# Patient Record
Sex: MALE | Race: WHITE | Employment: UNEMPLOYED | URBAN - METROPOLITAN AREA
[De-identification: names, ages, dates, MRNs, and addresses within clinical notes are randomized per-mention and may not be internally consistent; named-entity substitution may affect disease eponyms.]

---

## 2021-03-26 DIAGNOSIS — M79.641 PAIN OF RIGHT HAND: Primary | ICD-10-CM

## 2021-03-29 ENCOUNTER — OFFICE VISIT (OUTPATIENT)
Dept: ORTHOPEDIC SURGERY | Age: 51
End: 2021-03-29
Payer: MEDICARE

## 2021-03-29 DIAGNOSIS — S62.352A CLOSED NONDISPLACED FRACTURE OF SHAFT OF THIRD METACARPAL BONE OF RIGHT HAND, INITIAL ENCOUNTER: Primary | ICD-10-CM

## 2021-03-29 PROCEDURE — 99203 OFFICE O/P NEW LOW 30 MIN: CPT | Performed by: PHYSICIAN ASSISTANT

## 2021-03-29 PROCEDURE — 29075 APPL CST ELBW FNGR SHORT ARM: CPT | Performed by: PHYSICIAN ASSISTANT

## 2021-04-01 PROBLEM — F31.9 BIPOLAR DISORDER (HCC): Status: ACTIVE | Noted: 2017-08-16

## 2021-04-01 PROBLEM — I73.9 PERIPHERAL VASCULAR DISEASE, UNSPECIFIED (HCC): Status: ACTIVE | Noted: 2017-08-24

## 2021-04-01 PROBLEM — E03.9 HYPOTHYROIDISM, UNSPECIFIED: Status: ACTIVE | Noted: 2017-08-16

## 2021-04-01 PROBLEM — F79 UNSPECIFIED INTELLECTUAL DISABILITIES: Status: ACTIVE | Noted: 2017-08-16

## 2021-04-01 RX ORDER — FLUPHENAZINE HYDROCHLORIDE 10 MG/1
TABLET ORAL
COMMUNITY

## 2021-04-01 RX ORDER — LEVOTHYROXINE SODIUM 0.12 MG/1
TABLET ORAL
COMMUNITY

## 2021-04-01 RX ORDER — RISPERIDONE 4 MG/1
TABLET, FILM COATED ORAL
COMMUNITY

## 2021-04-01 RX ORDER — BENZTROPINE MESYLATE 2 MG/1
TABLET ORAL
COMMUNITY

## 2021-04-01 RX ORDER — ALUMINA, MAGNESIA, AND SIMETHICONE 2400; 2400; 240 MG/30ML; MG/30ML; MG/30ML
SUSPENSION ORAL
COMMUNITY

## 2021-04-01 RX ORDER — DIVALPROEX SODIUM 500 MG/1
TABLET, DELAYED RELEASE ORAL
COMMUNITY

## 2021-04-01 RX ORDER — LORATADINE 10 MG/1
TABLET ORAL
COMMUNITY

## 2021-04-01 ASSESSMENT — ENCOUNTER SYMPTOMS
VOMITING: 0
COLOR CHANGE: 0
COUGH: 0
SHORTNESS OF BREATH: 0

## 2021-04-01 NOTE — PROGRESS NOTES
[facility]  [facility]  [address]  [place] [facility] [address]  Dept: [phone]    Ambulatory Orthopedic New Patient Visit      CHIEF COMPLAINT:    Chief Complaint   Patient presents with    Hand Pain     right       HISTORY OF PRESENT ILLNESS:      Date of Injury: unknown     The patient is a 54 y.o. male who is being seen  for consultation and evaluation of right hand pain. Patient is currently an inmate at [address] and is accompanied by 2 guards today in office. The patient does not know the specific date of injury but notes that he slammed his hand down on a table within the last 2 weeks and has had pain. The patient had an x-ray at [address] and was found to have a fracture, therefore he is here for further evaluation. Patient currently is in the medical unit at [address] and does not have a cellmate. The corrections officers note that he has been taking Tylenol and ibuprofen for his right hand pain but has not had any sort of splint placed on the right hand. The patient is right-hand dominant and denies previous trauma or injury to the right hand. Patient currently denies pain within the right hand but notes swelling. He notes he is able to make a tight fist without discomfort. He denies numbness and tingling in the right upper extremity. Past Medical History:    No past medical history on file. Past Surgical History:    No past surgical history on file. Current Medications:   Current Outpatient Medications   Medication Sig Dispense Refill    Acetaminophen 325 MG CAPS acetaminophen 325 mg capsule   Take 2 capsules every 4 hours by oral route as needed.  benztropine (COGENTIN) 2 MG tablet benztropine 2 mg tablet   Take 1 tablet twice a day by oral route.  divalproex (DEPAKOTE) 500 MG DR tablet divalproex 500 mg tablet,delayed release   Take 1 tablet twice a day by oral route.       fluPHENAZine HCl (PROLIXIN) 10 MG tablet fluphenazine 10 mg tablet   Take 1 tablet every day by oral route.  levothyroxine (SYNTHROID) 125 MCG tablet levothyroxine 125 mcg tablet   Take 1 tablet every day by oral route.  loratadine (CLARITIN) 10 MG tablet loratadine 10 mg tablet   Take 1 tablet every day by oral route.  risperiDONE (RISPERDAL) 4 MG tablet risperidone 4 mg tablet   Take 1 tablet twice a day by oral route.  magnesium hydroxide (MILK OF MAGNESIA) 400 MG/5ML suspension Milk of Magnesia   30 mL by mouth once daily as needed for constipation      aluminum & magnesium hydroxide-simethicone (ANTACID MAXIMUM STRENGTH) 400-400-40 MG/5ML SUSP Antacid Maximum Strength   15ML by mouth every 6 hours as needed for indigestion/upset stomach       No current facility-administered medications for this visit.         Allergies:    No known allergies    Social History:   Social History     Socioeconomic History    Marital status: Single     Spouse name: Not on file    Number of children: Not on file    Years of education: Not on file    Highest education level: Not on file   Occupational History    Not on file   Social Needs    Financial resource strain: Not on file    Food insecurity     Worry: Not on file     Inability: Not on file    Transportation needs     Medical: Not on file     Non-medical: Not on file   Tobacco Use    Smoking status: Not on file   Substance and Sexual Activity    Alcohol use: Not on file    Drug use: Not on file    Sexual activity: Not on file   Lifestyle    Physical activity     Days per week: Not on file     Minutes per session: Not on file    Stress: Not on file   Relationships    Social connections     Talks on phone: Not on file     Gets together: Not on file     Attends Anabaptist service: Not on file     Active member of club or organization: Not on file     Attends meetings of clubs or organizations: Not on file     Relationship status: Not on file    Intimate partner violence     Fear of current or ex partner: Not on file     Emotionally abused: Not on file     Physically abused: Not on file     Forced sexual activity: Not on file   Other Topics Concern    Not on file   Social History Narrative    Not on file       Family History:  No family history on file. REVIEW OF SYSTEMS:  Review of Systems   Constitutional: Negative for activity change and fever. HENT: Negative for sneezing. Respiratory: Negative for cough and shortness of breath. Cardiovascular: Negative for chest pain. Gastrointestinal: Negative for vomiting. Musculoskeletal: Positive for arthralgias (right hand) and joint swelling (right hand). Negative for myalgias. Skin: Negative for color change. Neurological: Negative for weakness and numbness. Psychiatric/Behavioral: Negative for sleep disturbance. PHYSICAL EXAM:  There were no vitals taken for this visit. There is no height or weight on file to calculate BMI. Physical Exam  Gen: alert and oriented  Psych:  Appropriate affect; Appropriate knowledge base; Appropriate mood; No hallucinations; Head: normocephalic, atraumatic   Chest: symmetric chest excursion  Pelvis: stable with ambulation  Ortho Exam  Extremity: Patient arrives seated in the wheelchair. Evaluation of the right hand reveals mild diffuse swelling along the dorsum of the right hand and all 5 fingers. There is healing ecchymosis noted within all 5 fingers. The patient arrived without a splint on the right upper extremity. Patient is able to make a tight composite fist with the right hand without difficulty. Tenderness palpation noted along the third metacarpal and its entirety. No tenderness palpation noted along the first, second, fourth or fifth metacarpals. No tenderness palpation noted along the phalanges on the right hand. Sensation is intact to light touch of the right upper extremity without focal deficits present.   The patient has full range of motion of the right wrist and elbow without difficulty. Radial pulse 2+ on the right. The skin is noted to be pink and warm with brisk capillary refill distally. Radiology:     Xr Hand Right (min 3 Views)    Result Date: 3/30/2021  History: Right middle finger metacarpal fracture Findings: AP, lateral, oblique x-rays of the right hand done in the office today shows middle finger comminuted metacarpal fracture healing in near-anatomic position. No further evidence of fracture, subluxation, dislocation, radiopaque foreign body, radiopaque tumors noted. Impression: Right middle finger metacarpal fracture healing as described above. Procedure:     After informed consent was obtained verbally, a well molded fast form short arm radial gutter cast was applied with the Right wrist and hand in an intrinsic plus position with the second and third fingers flexed to approximately 90 degrees. No impingement of the cast was noted proximally or distally after application. The fingers were noted to be pink, warm, with brisk capillary refill after application of the right upper extremity radial gutter cast.  The patient tolerated the fast form cast application well without complications. ASSESSMENT:     1. Closed nondisplaced fracture of shaft of third metacarpal bone of right hand, initial encounter         PLAN:       Today in office we discussed etiology and natural history of right hand third metacarpal fracture. I personally reviewed the patient's x-rays from today revealing right hand, comminuted third metacarpal fracture healing in near anatomic position. Due to the fact that the patient is an inmate at [address] he was placed in a fast form radial gutter cast on the right upper extremity in an intrinsic plus position. Patient was instructed to keep the cast clean and dry and follow-up in 2 week for x-rays of the right hand out of splint.   The patient was placed in a fast form cast due to the fact that he cannot have any casting material or splints with metal pieces in them. The patient's correction officers were instructed to have Julia Rios from [address] call to schedule the 2 week follow-up appointment. The patient may continue to take Tylenol and ibuprofen as needed for his right upper extremity pain, which will be scheduled by the half-way. The patient will follow up in 2 weeks for x-rays of the right hand out of splint. Return in about 2 weeks (around 4/12/2021) for re-evaluation, right hand x-rays. No orders of the defined types were placed in this encounter. Orders Placed This Encounter   Procedures    [address] - [place] APPLY FOREARM CAST       This note is created with the assistance of a speech recognition program.  While intending to generate a document that actually reflects the content of the visit, the document can still have some errors including those of syntax and sound a like substitutions which may escape proof reading. In such instances, actual meaning can be extrapolated by contextual diversion.      Electronically signed by Harlan Sanchez PA-C 4/1/2021 at 10:02 AM

## 2021-04-09 DIAGNOSIS — S62.352A CLOSED NONDISPLACED FRACTURE OF SHAFT OF THIRD METACARPAL BONE OF RIGHT HAND, INITIAL ENCOUNTER: Primary | ICD-10-CM

## 2021-04-19 ENCOUNTER — OFFICE VISIT (OUTPATIENT)
Dept: ORTHOPEDIC SURGERY | Age: 51
End: 2021-04-19

## 2021-04-19 DIAGNOSIS — S62.352D CLOSED NONDISPLACED FRACTURE OF SHAFT OF THIRD METACARPAL BONE OF RIGHT HAND WITH ROUTINE HEALING, SUBSEQUENT ENCOUNTER: Primary | ICD-10-CM

## 2021-04-19 PROCEDURE — 99214 OFFICE O/P EST MOD 30 MIN: CPT | Performed by: ORTHOPAEDIC SURGERY

## 2021-04-19 PROCEDURE — 29075 APPL CST ELBW FNGR SHORT ARM: CPT | Performed by: ORTHOPAEDIC SURGERY

## 2021-04-19 ASSESSMENT — ENCOUNTER SYMPTOMS
CHEST TIGHTNESS: 0
COUGH: 0
VOMITING: 0
ABDOMINAL PAIN: 0
APNEA: 0

## 2021-04-19 NOTE — PROGRESS NOTES
[facility]  [facility] SPECIAL[facility]  [address] [place]  [address]  Dept: [phone]  Dept Fax: [phone]        Ambulatory Follow Up      Subjective:   Kristyn Moody is a 54y.o. year old male who presents to our office today for routine followup regarding his   1. Closed nondisplaced fracture of shaft of third metacarpal bone of right hand with routine healing, subsequent encounter    . Chief Complaint   Patient presents with    Follow-up     rt hand fx        HPI- Kristyn Moody  is a 54 y.o. Right hand dominant  male who presents today in follow for third metacarpal fracture that was sustained on an unknown date. The patient is an inmate from [employer] and has two police escorts today. The patient was last seen on 3/29/2021 and underwent treatment in the form of Fast form ulnar gutter cast to the right upper extremity. The patient has maintained splint to the right upper extremity at this time. The patient overall has mild pain to the right hand with making a fist.         Review of Systems   Constitutional: Negative for chills and fever. Respiratory: Negative for apnea, cough and chest tightness. Cardiovascular: Negative for chest pain and palpitations. Gastrointestinal: Negative for abdominal pain and vomiting. Genitourinary: Negative for difficulty urinating. Musculoskeletal: Positive for arthralgias (right hand). Negative for gait problem, joint swelling and myalgias. Neurological: Negative for dizziness, weakness and numbness. I have reviewed the CC, HPI, ROS, PMH, FHX, Social History, and if not present in this note, I have reviewed in the patient's chart. I agree with the documentation provided by other staff and have reviewed their documentation prior to providing my signature indicating agreement. Objective : There were no vitals taken for this visit. There is no height or weight on file to calculate BMI.   General: Kristyn Moody is a 54 y.o. male who is alert and oriented and sitting comfortably in our office. Ortho Exam  MS:  Mild diffuse swelling right hand. Patient able to make a loose composite fist on the right. Mild extensor lag MCP right middle finger. Motor, sensory, vascular examination right upper extremity is grossly intact without focal deficits. Cascade is normal right hand. Neuro: alert and oriented to person and place. Eyes: Extra-ocular muscles intact  Mouth: Oral mucosa moist. No perioral lesions  Pulm: Respirations unlabored and regular. Symmetric chest excursion without outward deformity is noted. Skin: warm, well perfused  Psych:   Patient has good fund of knowledge and displays understanging of exam, diagnosis, and plan. Radiology:     Xr Hand Right (min 3 Views)    Result Date: 4/19/2021  History: Right middle finger metacarpal fracture Findings: AP, lateral, oblique x-rays of the right hand done in the office today shows a right middle finger metacarpal fracture, comminuted, mild displacement, mild angulation, unchanged in alignment from previous x-ray evaluation of March 29, 2021. Mild increased bony callus formation and bony consolidation is appreciated today when compared to those x-rays of March 29, 2021. No further evidence of fracture, subluxation, dislocation, radiopaque foreign body, radiopaque tumors noted. Impression: Right middle finger metacarpal fracture healing as described above. Xr Hand Right (min 3 Views)    Result Date: 3/30/2021  History: Right middle finger metacarpal fracture Findings: AP, lateral, oblique x-rays of the right hand done in the office today shows middle finger comminuted metacarpal fracture healing in near-anatomic position. No further evidence of fracture, subluxation, dislocation, radiopaque foreign body, radiopaque tumors noted.  Impression: Right middle finger metacarpal fracture healing as described above    After informed consent was obtained verbally, a well molded well-padded short arm fast form cast was applied with the Right    wrist in neutral position. No impingement of the cast was noted proximally or distally after application of a cast.  Fingers were noted to be pink, warm, with brisk capillary refill after application of the cast.  Tolerated the procedure well without post cast application complications. Assessment:      1. Closed nondisplaced fracture of shaft of third metacarpal bone of right hand with routine healing, subsequent encounter       Plan:     Reviewed x-rays with the patient today in the office. Discussed with the patient that we will place him into a new fast form short arm cast. Will have the patient follow up in the office in 1 month for x-rays out of cast. The patient knows to call with any questions or concerns. Follow up:Return in about 4 weeks (around 5/17/2021) for Xrays out of cast.    No orders of the defined types were placed in this encounter. No orders of the defined types were placed in this encounter. Tracy Valente LPN am scribing for and in the presence of Dr. Paulo Luke  4/20/2021 9:27 PM      I have reviewed and made changes accordingly to the work scribed by Belkis Mehta LPN. The documentation accurately reflects work and decisions made by me. I have also reviewed documentation completed by clinical staff.     Paulo Luke DO, [facility] Medicine  4/20/2021 9:27 PM    This note is created with the assistance of a speech recognition program.  While intending to generate a document that actually reflects the content of the visit, the document can still have some errors including those of syntax and sound a like substitutions which may escape proof reading.  In such instances, actual meaning can be extrapolated by contextual diversion      Electronically signed by Jorge Luis Sargent DO, FAOAO on 4/20/2021 at 9:27 PM

## 2024-08-13 ENCOUNTER — HOSPITAL ENCOUNTER (EMERGENCY)
Age: 54
Discharge: HOME | End: 2024-08-13
Attending: EMERGENCY MEDICINE
Payer: MEDICARE

## 2024-08-13 ENCOUNTER — APPOINTMENT (OUTPATIENT)
Dept: CT IMAGING | Age: 54
End: 2024-08-13
Payer: MEDICARE

## 2024-08-13 ENCOUNTER — APPOINTMENT (OUTPATIENT)
Dept: GENERAL RADIOLOGY | Age: 54
End: 2024-08-13
Payer: MEDICARE

## 2024-08-13 VITALS
BODY MASS INDEX: 20.66 KG/M2 | HEART RATE: 63 BPM | DIASTOLIC BLOOD PRESSURE: 82 MMHG | HEIGHT: 77 IN | OXYGEN SATURATION: 96 % | RESPIRATION RATE: 15 BRPM | TEMPERATURE: 97.7 F | SYSTOLIC BLOOD PRESSURE: 115 MMHG | WEIGHT: 175 LBS

## 2024-08-13 DIAGNOSIS — R55 NEAR SYNCOPE: Primary | ICD-10-CM

## 2024-08-13 LAB
ALBUMIN SERPL-MCNC: 3.9 G/DL (ref 3.5–5.2)
ALBUMIN/GLOB SERPL: 2 {RATIO} (ref 1–2.5)
ALP SERPL-CCNC: 67 U/L (ref 40–129)
ALT SERPL-CCNC: 17 U/L (ref 10–50)
AMMONIA PLAS-SCNC: 42 UMOL/L (ref 16–60)
ANION GAP SERPL CALCULATED.3IONS-SCNC: 12 MMOL/L (ref 9–16)
AST SERPL-CCNC: 21 U/L (ref 10–50)
BACTERIA URNS QL MICRO: ABNORMAL
BASOPHILS # BLD: 0.07 K/UL (ref 0–0.2)
BASOPHILS NFR BLD: 1 % (ref 0–2)
BILIRUB SERPL-MCNC: 0.2 MG/DL (ref 0–1.2)
BILIRUB UR QL STRIP: NEGATIVE
BUN BLD-MCNC: 21 MG/DL (ref 8–26)
BUN SERPL-MCNC: 17 MG/DL (ref 6–20)
CA-I BLD-SCNC: 1.18 MMOL/L (ref 1.13–1.33)
CA-I BLD-SCNC: 1.35 MMOL/L (ref 1.15–1.33)
CALCIUM SERPL-MCNC: 8.5 MG/DL (ref 8.6–10.4)
CASTS #/AREA URNS LPF: ABNORMAL /LPF (ref 0–8)
CHLORIDE BLD-SCNC: 101 MMOL/L (ref 98–107)
CHLORIDE SERPL-SCNC: 104 MMOL/L (ref 98–107)
CLARITY UR: CLEAR
CO2 BLD CALC-SCNC: 30 MMOL/L (ref 22–30)
CO2 SERPL-SCNC: 20 MMOL/L (ref 20–31)
COLOR UR: YELLOW
CREAT SERPL-MCNC: 1.2 MG/DL (ref 0.7–1.2)
EGFR, POC: 73 ML/MIN/1.73M2
EOSINOPHIL # BLD: 0.21 K/UL (ref 0–0.44)
EOSINOPHILS RELATIVE PERCENT: 3 % (ref 1–4)
EPI CELLS #/AREA URNS HPF: ABNORMAL /HPF (ref 0–5)
ERYTHROCYTE [DISTWIDTH] IN BLOOD BY AUTOMATED COUNT: 12 % (ref 11.8–14.4)
GFR, ESTIMATED: 70 ML/MIN/1.73M2
GLUCOSE BLD-MCNC: 140 MG/DL (ref 74–100)
GLUCOSE SERPL-MCNC: 111 MG/DL (ref 74–99)
GLUCOSE UR STRIP-MCNC: NEGATIVE MG/DL
HCO3 VENOUS: 29 MMOL/L (ref 22–29)
HCT VFR BLD AUTO: 43.5 % (ref 40.7–50.3)
HCT VFR BLD AUTO: 46 % (ref 41–53)
HGB BLD-MCNC: 14.7 G/DL (ref 13–17)
HGB UR QL STRIP.AUTO: NEGATIVE
IMM GRANULOCYTES # BLD AUTO: <0.03 K/UL (ref 0–0.3)
IMM GRANULOCYTES NFR BLD: 0 %
KETONES UR STRIP-MCNC: NEGATIVE MG/DL
LEUKOCYTE ESTERASE UR QL STRIP: NEGATIVE
LYMPHOCYTES NFR BLD: 2.59 K/UL (ref 1.1–3.7)
LYMPHOCYTES RELATIVE PERCENT: 37 % (ref 24–43)
MCH RBC QN AUTO: 30.6 PG (ref 25.2–33.5)
MCHC RBC AUTO-ENTMCNC: 33.8 G/DL (ref 28.4–34.8)
MCV RBC AUTO: 90.6 FL (ref 82.6–102.9)
MONOCYTES NFR BLD: 0.48 K/UL (ref 0.1–1.2)
MONOCYTES NFR BLD: 7 % (ref 3–12)
NEGATIVE BASE EXCESS, VEN: 0.4 MMOL/L (ref 0–2)
NEUTROPHILS NFR BLD: 52 % (ref 36–65)
NEUTS SEG NFR BLD: 3.68 K/UL (ref 1.5–8.1)
NITRITE UR QL STRIP: NEGATIVE
NRBC BLD-RTO: 0 PER 100 WBC
O2 SAT, VEN: 21.2 % (ref 60–85)
PCO2 VENOUS: 67.4 MM HG (ref 41–51)
PH UR STRIP: 5.5 [PH] (ref 5–8)
PH VENOUS: 7.24 (ref 7.32–7.43)
PLATELET # BLD AUTO: 147 K/UL (ref 138–453)
PMV BLD AUTO: 11.5 FL (ref 8.1–13.5)
PO2 VENOUS: 19 MM HG (ref 30–50)
POC ANION GAP: 10 MMOL/L (ref 7–16)
POC CREATININE: 1.2 MG/DL (ref 0.51–1.19)
POC HEMOGLOBIN (CALC): 15.5 G/DL (ref 13.5–17.5)
POC LACTIC ACID: 1.7 MMOL/L (ref 0.56–1.39)
POTASSIUM BLD-SCNC: 4.2 MMOL/L (ref 3.5–4.5)
POTASSIUM SERPL-SCNC: 3.7 MMOL/L (ref 3.7–5.3)
PROT SERPL-MCNC: 6 G/DL (ref 6.6–8.7)
PROT UR STRIP-MCNC: NEGATIVE MG/DL
RBC # BLD AUTO: 4.8 M/UL (ref 4.21–5.77)
RBC #/AREA URNS HPF: ABNORMAL /HPF (ref 0–4)
SODIUM BLD-SCNC: 140 MMOL/L (ref 138–146)
SODIUM SERPL-SCNC: 136 MMOL/L (ref 136–145)
SP GR UR STRIP: 1.06 (ref 1–1.03)
TROPONIN I SERPL HS-MCNC: 10 NG/L (ref 0–22)
TROPONIN I SERPL HS-MCNC: <6 NG/L (ref 0–22)
TROPONIN I SERPL HS-MCNC: <6 NG/L (ref 0–22)
TSH SERPL DL<=0.05 MIU/L-ACNC: 0.33 UIU/ML (ref 0.27–4.2)
UROBILINOGEN UR STRIP-ACNC: NORMAL EU/DL (ref 0–1)
WBC #/AREA URNS HPF: ABNORMAL /HPF (ref 0–5)
WBC OTHER # BLD: 7 K/UL (ref 3.5–11.3)

## 2024-08-13 PROCEDURE — 80051 ELECTROLYTE PANEL: CPT

## 2024-08-13 PROCEDURE — 70498 CT ANGIOGRAPHY NECK: CPT

## 2024-08-13 PROCEDURE — 70496 CT ANGIOGRAPHY HEAD: CPT

## 2024-08-13 PROCEDURE — 82803 BLOOD GASES ANY COMBINATION: CPT

## 2024-08-13 PROCEDURE — 99285 EMERGENCY DEPT VISIT HI MDM: CPT

## 2024-08-13 PROCEDURE — 80164 ASSAY DIPROPYLACETIC ACD TOT: CPT

## 2024-08-13 PROCEDURE — 83605 ASSAY OF LACTIC ACID: CPT

## 2024-08-13 PROCEDURE — 96361 HYDRATE IV INFUSION ADD-ON: CPT

## 2024-08-13 PROCEDURE — 82565 ASSAY OF CREATININE: CPT

## 2024-08-13 PROCEDURE — 70450 CT HEAD/BRAIN W/O DYE: CPT

## 2024-08-13 PROCEDURE — 82947 ASSAY GLUCOSE BLOOD QUANT: CPT

## 2024-08-13 PROCEDURE — 6360000004 HC RX CONTRAST MEDICATION

## 2024-08-13 PROCEDURE — 80165 DIPROPYLACETIC ACID FREE: CPT

## 2024-08-13 PROCEDURE — 2580000003 HC RX 258

## 2024-08-13 PROCEDURE — 84520 ASSAY OF UREA NITROGEN: CPT

## 2024-08-13 PROCEDURE — 85014 HEMATOCRIT: CPT

## 2024-08-13 PROCEDURE — 71046 X-RAY EXAM CHEST 2 VIEWS: CPT

## 2024-08-13 PROCEDURE — 85025 COMPLETE CBC W/AUTO DIFF WBC: CPT

## 2024-08-13 PROCEDURE — 96360 HYDRATION IV INFUSION INIT: CPT

## 2024-08-13 PROCEDURE — 80053 COMPREHEN METABOLIC PANEL: CPT

## 2024-08-13 PROCEDURE — 82330 ASSAY OF CALCIUM: CPT

## 2024-08-13 PROCEDURE — 81001 URINALYSIS AUTO W/SCOPE: CPT

## 2024-08-13 PROCEDURE — 84443 ASSAY THYROID STIM HORMONE: CPT

## 2024-08-13 PROCEDURE — 84484 ASSAY OF TROPONIN QUANT: CPT

## 2024-08-13 PROCEDURE — 93005 ELECTROCARDIOGRAM TRACING: CPT

## 2024-08-13 PROCEDURE — 82140 ASSAY OF AMMONIA: CPT

## 2024-08-13 PROCEDURE — 6370000000 HC RX 637 (ALT 250 FOR IP)

## 2024-08-13 RX ORDER — 0.9 % SODIUM CHLORIDE 0.9 %
1000 INTRAVENOUS SOLUTION INTRAVENOUS ONCE
Status: COMPLETED | OUTPATIENT
Start: 2024-08-13 | End: 2024-08-13

## 2024-08-13 RX ORDER — ACETAMINOPHEN 500 MG
1000 TABLET ORAL ONCE
Status: COMPLETED | OUTPATIENT
Start: 2024-08-13 | End: 2024-08-13

## 2024-08-13 RX ADMIN — IOPAMIDOL 90 ML: 755 INJECTION, SOLUTION INTRAVENOUS at 09:54

## 2024-08-13 RX ADMIN — ACETAMINOPHEN 1000 MG: 500 TABLET ORAL at 12:21

## 2024-08-13 RX ADMIN — SODIUM CHLORIDE 1000 ML: 9 INJECTION, SOLUTION INTRAVENOUS at 09:44

## 2024-08-13 ASSESSMENT — PAIN - FUNCTIONAL ASSESSMENT: PAIN_FUNCTIONAL_ASSESSMENT: ADULT NONVERBAL PAIN SCALE (NPVS)

## 2024-08-13 ASSESSMENT — ENCOUNTER SYMPTOMS: SHORTNESS OF BREATH: 0

## 2024-08-13 ASSESSMENT — LIFESTYLE VARIABLES
HOW OFTEN DO YOU HAVE A DRINK CONTAINING ALCOHOL: NEVER
HOW MANY STANDARD DRINKS CONTAINING ALCOHOL DO YOU HAVE ON A TYPICAL DAY: PATIENT DOES NOT DRINK

## 2024-08-13 ASSESSMENT — PAIN SCALES - GENERAL: PAINLEVEL_OUTOF10: 10

## 2024-08-13 NOTE — DISCHARGE INSTRUCTIONS
Take your medication as indicated and prescribed.   Get up slowly; dangle your feet over the bed before standing up, do not stand up quickly.    No signs of a stroke on the CT scans of your head.  No signs of a heart attack.  No signs of infection.  Not significantly dehydrated.  You had low blood pressure when you got here that improved.  You may have been slightly dehydrated or had a lightheaded episode.    If you have not had a stress test in over a year your primary care physician may order this test as further work-up for your lightheadedness.  If you have a cardiologist, then you should also call them to discuss further treatment options.    PLEASE RETURN TO THE EMERGENCY DEPARTMENT IMMEDIATELY for worsening symptoms of increasing pain, shortness of breath, feeling of your heart fluttering or racing, swelling to your feet, unable to lay flat, sensation of the room spinning, slurring of speech, loss of strength, or if you develop any concerning symptoms such as: high fever not relieved by acetaminophen (Tylenol) and/or ibuprofen (Motrin / Advil), chills, persistent nausea and/or vomiting, vomiting up blood, blood in your stool, loss of consciousness, numbness, weakness or tingling in the arms or legs or change in color of the extremities, changes in mental status, persistent headache, blurry vision, loss of bladder / bowel control, unable to follow up with your physician, or other any other care or concern.

## 2024-08-13 NOTE — ED TRIAGE NOTES
Pt presents to ED from home with  who is c/o altered mental status and pt falling 15-20 mins PTA. PT has hx of MRDD, is on mulitple medications which are on chart. Pt denies n/v/d, LOC, CP, SOB, fever, chills, injury/trauma, change in bowel/bladder function, loss of appetite, pain, or any other sx.     Pt is A&OX4, placed on full monitor, EKG done, IV established, changed into gown and given warm blankets. EPOC ran at bedside. Labs drawn, labeled, and sent to lab. Pt vitals show HYPOtension, but otherwise WNL.  is at bedside. White board updated, and patient is updated on plan of care.

## 2024-08-13 NOTE — ED PROVIDER NOTES
[facility] ED  Emergency Department Encounter  Emergency Medicine Resident     Pt Name:Otoniel Daly  MRN: [id number]  Birthdate 1970  Date of evaluation: 8/13/24  PCP:  No primary care provider on file.  Note Started: 11:54 AM EDT      CHIEF COMPLAINT       Chief Complaint   Patient presents with    Dizziness    Altered Mental Status       HISTORY OF PRESENT ILLNESS  (Location/Symptom, Timing/Onset, Context/Setting, Quality, Duration, Modifying Factors, Severity.)      Otoniel Daly is a 54 y.o. male with past medical history of intellectual disability, bipolar disorder, schizophrenia who presents with dizziness and concerns for altered mental status.  Patient arrives with a worker from the home that he lives in.  Per worker, patient was acting his normal self until approximately 20 minutes prior to arrival to the ED when patient became \"discombobulated.\"  Worker states that patient felt dizzy, was lowered to the ground.  He did not hit his head or lose consciousness.  No witnessed seizure-like activity.  Patient was not complaining of chest pain, shortness of breath, weakness.  Patient does have low blood pressure at baseline per worker.  Has been taking all of his medications as prescribed.  Patient denies feeling dizzy or lightheaded.  He denies chest pain, shortness of breath.  Has not been sick recently.  Currently denies any significant complaints.    PAST MEDICAL / SURGICAL / SOCIAL / FAMILY HISTORY      has no past medical history on file.       has no past surgical history on file.      Social History     Socioeconomic History    Marital status: Single     Spouse name: Not on file    Number of children: Not on file    Years of education: Not on file    Highest education level: Not on file   Occupational History    Not on file   Tobacco Use    Smoking status: Unknown    Smokeless tobacco: Not on file   Substance and Sexual Activity    Alcohol use: Not on file    Drug use: Defer 
GLUCOSE - Abnormal; Notable for the following components:    POC Glucose 140 (*)     All other components within normal limits   ELECTROLYTES PLUS   HGB/HCT   AMMONIA   CALCIUM, IONIZED   CBC WITH AUTO DIFFERENTIAL   COMPREHENSIVE METABOLIC PANEL   URINALYSIS WITH MICROSCOPIC   TROPONIN   TROPONIN   TSH WITH REFLEX   VALPROIC ACID LEVEL, TOTAL AND FREE   UREA N (POC)     EKG normal sinus rhythm at 75 bpm CA intervals 158 ms QRS durations 120 ms QT corrected 457 ms axis is 18 there is no acute ST or T wave changes    PERTINENT ATTENDING PHYSICIAN COMMENTS:    Patient presents with altered mental status \"became discombobulated\" as described by , patient was crossing the street he became confused sweaty and took a knee the worker had to get him up when squad arrived he had a pressure in the 70s but is back up into the 90s now and he is at his baseline there is been no trauma to the  helped him to the ground.    Critical Care          Alexandru Beltran MD,  MD, FAAEM  Attending Emergency  Physician              Alexandru Beltran MD  08/13/24 1456

## 2024-08-13 NOTE — ED NOTES
The following labs were labeled with appropriate pt sticker and tubed to lab:     [x] Blue     [x] Lavender   [x] on ice  [] Green/yellow  [x] Green/black [x] on ice  [x] Yellow  [x] Red  [] Pink  [] Type/ Screen  [] ABG  [] VBG    [] COVID-19 swab    [] Rapid  [] PCR  [] Flu swab  [] Peds Viral Panel     [] Urine Sample  [] Fecal Sample  [] Pelvic Cultures  [] Blood Cultures  [] X 2  [] STREP Cultures  [] Wound Cultures

## 2024-08-15 LAB
VALPROIC ACID % FREE: 17 % (ref 5–18)
VALPROIC ACID TOTAL: 73 UG/ML (ref 50–125)
VALPROIC ACID, FREE: 13 UG/ML (ref 7–23)

## 2024-08-16 LAB
EKG ATRIAL RATE: 75 BPM
EKG P AXIS: 78 DEGREES
EKG P-R INTERVAL: 158 MS
EKG Q-T INTERVAL: 410 MS
EKG QRS DURATION: 120 MS
EKG QTC CALCULATION (BAZETT): 457 MS
EKG R AXIS: 18 DEGREES
EKG T AXIS: 75 DEGREES
EKG VENTRICULAR RATE: 75 BPM